# Patient Record
Sex: MALE | Race: AMERICAN INDIAN OR ALASKA NATIVE | NOT HISPANIC OR LATINO | URBAN - METROPOLITAN AREA
[De-identification: names, ages, dates, MRNs, and addresses within clinical notes are randomized per-mention and may not be internally consistent; named-entity substitution may affect disease eponyms.]

---
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
## 2021-03-05 ENCOUNTER — APPOINTMENT (OUTPATIENT)
Dept: INTERNAL MEDICINE | Facility: CLINIC | Age: 21
End: 2021-03-05
Payer: MEDICAID

## 2021-03-05 VITALS
HEART RATE: 86 BPM | SYSTOLIC BLOOD PRESSURE: 125 MMHG | WEIGHT: 264 LBS | RESPIRATION RATE: 16 BRPM | DIASTOLIC BLOOD PRESSURE: 86 MMHG | HEIGHT: 70 IN | TEMPERATURE: 97.6 F | BODY MASS INDEX: 37.8 KG/M2 | OXYGEN SATURATION: 96 %

## 2021-03-05 DIAGNOSIS — J45.909 UNSPECIFIED ASTHMA, UNCOMPLICATED: ICD-10-CM

## 2021-03-05 PROCEDURE — 99213 OFFICE O/P EST LOW 20 MIN: CPT

## 2021-03-05 PROCEDURE — 99072 ADDL SUPL MATRL&STAF TM PHE: CPT

## 2021-03-05 NOTE — PHYSICAL EXAM
[No Edema] : there was no peripheral edema [Normal] : affect was normal and insight and judgment were intact [de-identified] : subdermal cyst left cheek

## 2021-03-05 NOTE — HISTORY OF PRESENT ILLNESS
[FreeTextEntry1] : routine follow up [de-identified] : 21yoM, dental school, PMH HLD, obesity presents for follow up\par \par lost 18 pounds\par feels better\par \par would like to see derm for facial cyst\par \par endorses occ chest tightness when exercises

## 2021-05-25 ENCOUNTER — NON-APPOINTMENT (OUTPATIENT)
Age: 21
End: 2021-05-25

## 2021-05-25 ENCOUNTER — APPOINTMENT (OUTPATIENT)
Dept: DERMATOLOGY | Facility: CLINIC | Age: 21
End: 2021-05-25
Payer: MEDICAID

## 2021-05-25 VITALS — BODY MASS INDEX: 35.7 KG/M2 | HEIGHT: 71 IN | WEIGHT: 255 LBS

## 2021-05-25 PROCEDURE — 99203 OFFICE O/P NEW LOW 30 MIN: CPT

## 2021-06-03 ENCOUNTER — APPOINTMENT (OUTPATIENT)
Dept: DERMATOLOGY | Facility: CLINIC | Age: 21
End: 2021-06-03
Payer: MEDICAID

## 2021-06-03 ENCOUNTER — LABORATORY RESULT (OUTPATIENT)
Age: 21
End: 2021-06-03

## 2021-06-03 DIAGNOSIS — D48.5 NEOPLASM OF UNCERTAIN BEHAVIOR OF SKIN: ICD-10-CM

## 2021-06-03 PROCEDURE — 11441 EXC FACE-MM B9+MARG 0.6-1 CM: CPT

## 2021-06-03 PROCEDURE — 12051 INTMD RPR FACE/MM 2.5 CM/<: CPT

## 2021-06-10 ENCOUNTER — NON-APPOINTMENT (OUTPATIENT)
Age: 21
End: 2021-06-10

## 2021-06-29 ENCOUNTER — NON-APPOINTMENT (OUTPATIENT)
Age: 21
End: 2021-06-29

## 2021-07-01 ENCOUNTER — NON-APPOINTMENT (OUTPATIENT)
Age: 21
End: 2021-07-01

## 2021-07-08 ENCOUNTER — EMERGENCY (EMERGENCY)
Facility: HOSPITAL | Age: 21
LOS: 1 days | Discharge: HOME | End: 2021-07-08
Attending: EMERGENCY MEDICINE
Payer: MEDICAID

## 2021-07-08 VITALS
HEART RATE: 81 BPM | TEMPERATURE: 98 F | RESPIRATION RATE: 17 BRPM | SYSTOLIC BLOOD PRESSURE: 128 MMHG | HEIGHT: 71 IN | OXYGEN SATURATION: 96 % | DIASTOLIC BLOOD PRESSURE: 71 MMHG | WEIGHT: 259.93 LBS

## 2021-07-08 PROCEDURE — 99283 EMERGENCY DEPT VISIT LOW MDM: CPT | Mod: 25

## 2021-07-08 PROCEDURE — 99283 EMERGENCY DEPT VISIT LOW MDM: CPT

## 2021-07-08 PROCEDURE — 73630 X-RAY EXAM OF FOOT: CPT | Mod: 26,LT

## 2021-07-08 PROCEDURE — 73630 X-RAY EXAM OF FOOT: CPT

## 2021-07-08 RX ORDER — DICLOFENAC SODIUM 30 MG/G
1 GEL TOPICAL
Qty: 60 | Refills: 0
Start: 2021-07-08 | End: 2021-07-21

## 2021-07-08 NOTE — ED PROVIDER NOTE - OBJECTIVE STATEMENT
21 y.o y.o male with no significant PMHx and PSHx presenting to the ED complaining of lateral pain on the left foot. Patient rates this pain a 4-5/10. Patient was playing soccer and developed this pain. Patient denies any other complaints. NKDA.

## 2021-07-08 NOTE — ED PROVIDER NOTE - PATIENT PORTAL LINK FT
You can access the FollowMyHealth Patient Portal offered by [facility] by registering at the following website: [email or web address]. By joining [email or web address]’s FollowMyHealth portal, you will also be able to view your health information using other applications (apps) compatible with our system.

## 2021-07-08 NOTE — ED PROVIDER NOTE - CLINICAL SUMMARY MEDICAL DECISION MAKING FREE TEXT BOX
21 y.o male presents with foot pain. Patient was consulted on no sports for x2 weeks and home economics. Will do x-ray and pain control.

## 2021-07-08 NOTE — ED ADULT NURSE NOTE - NSIMPLEMENTINTERV_GEN_ALL_ED
Implemented All Universal Safety Interventions:  [place] to call system. Call bell, personal items and telephone within reach. Instruct patient to call for assistance. Room bathroom lighting operational. Non-slip footwear when patient is off stretcher. Physically safe environment: no spills, clutter or unnecessary equipment. Stretcher in lowest position, wheels locked, appropriate side rails in place.

## 2021-07-13 ENCOUNTER — APPOINTMENT (OUTPATIENT)
Dept: DERMATOLOGY | Facility: CLINIC | Age: 21
End: 2021-07-13

## 2021-09-14 LAB
ALBUMIN SERPL ELPH-MCNC: 5 G/DL
ALP BLD-CCNC: 88 U/L
ALT SERPL-CCNC: 48 U/L
ANION GAP SERPL CALC-SCNC: 13 MMOL/L
AST SERPL-CCNC: 25 U/L
BASOPHILS # BLD AUTO: 0.07 K/UL
BASOPHILS NFR BLD AUTO: 0.7 %
BILIRUB SERPL-MCNC: 0.5 MG/DL
BUN SERPL-MCNC: 16 MG/DL
CALCIUM SERPL-MCNC: 10.2 MG/DL
CHLORIDE SERPL-SCNC: 102 MMOL/L
CHOLEST SERPL-MCNC: 238 MG/DL
CO2 SERPL-SCNC: 24 MMOL/L
CREAT SERPL-MCNC: 0.91 MG/DL
EOSINOPHIL # BLD AUTO: 0.2 K/UL
EOSINOPHIL NFR BLD AUTO: 2 %
ESTIMATED AVERAGE GLUCOSE: 105 MG/DL
GLUCOSE SERPL-MCNC: 97 MG/DL
HBA1C MFR BLD HPLC: 5.3 %
HCT VFR BLD CALC: 54.1 %
HDLC SERPL-MCNC: 49 MG/DL
HGB BLD-MCNC: 17.8 G/DL
IMM GRANULOCYTES NFR BLD AUTO: 0.4 %
LDLC SERPL CALC-MCNC: 157 MG/DL
LYMPHOCYTES # BLD AUTO: 3.22 K/UL
LYMPHOCYTES NFR BLD AUTO: 32 %
MAN DIFF?: NORMAL
MCHC RBC-ENTMCNC: 29.5 PG
MCHC RBC-ENTMCNC: 32.9 GM/DL
MCV RBC AUTO: 89.7 FL
MONOCYTES # BLD AUTO: 0.82 K/UL
MONOCYTES NFR BLD AUTO: 8.2 %
NEUTROPHILS # BLD AUTO: 5.7 K/UL
NEUTROPHILS NFR BLD AUTO: 56.7 %
NONHDLC SERPL-MCNC: 189 MG/DL
PLATELET # BLD AUTO: 308 K/UL
POTASSIUM SERPL-SCNC: 4.8 MMOL/L
PROT SERPL-MCNC: 7.6 G/DL
RBC # BLD: 6.03 M/UL
RBC # FLD: 13.3 %
SODIUM SERPL-SCNC: 139 MMOL/L
TRIGL SERPL-MCNC: 159 MG/DL
WBC # FLD AUTO: 10.05 K/UL

## 2021-09-17 ENCOUNTER — APPOINTMENT (OUTPATIENT)
Dept: INTERNAL MEDICINE | Facility: CLINIC | Age: 21
End: 2021-09-17
Payer: MEDICAID

## 2021-09-17 ENCOUNTER — TRANSCRIPTION ENCOUNTER (OUTPATIENT)
Age: 21
End: 2021-09-17

## 2021-09-17 VITALS
DIASTOLIC BLOOD PRESSURE: 83 MMHG | SYSTOLIC BLOOD PRESSURE: 132 MMHG | HEIGHT: 71 IN | TEMPERATURE: 97.3 F | BODY MASS INDEX: 37.94 KG/M2 | OXYGEN SATURATION: 97 % | RESPIRATION RATE: 16 BRPM | HEART RATE: 74 BPM | WEIGHT: 271 LBS

## 2021-09-17 DIAGNOSIS — E78.5 HYPERLIPIDEMIA, UNSPECIFIED: ICD-10-CM

## 2021-09-17 PROCEDURE — 99395 PREV VISIT EST AGE 18-39: CPT

## 2021-09-17 RX ORDER — ALBUTEROL SULFATE 90 UG/1
108 (90 BASE) INHALANT RESPIRATORY (INHALATION)
Qty: 1 | Refills: 3 | Status: ACTIVE | COMMUNITY
Start: 2021-09-17 | End: 1900-01-01

## 2021-09-18 PROBLEM — E78.5 HLD (HYPERLIPIDEMIA): Status: ACTIVE | Noted: 2020-07-22
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
## 2022-07-08 ENCOUNTER — APPOINTMENT (OUTPATIENT)
Dept: INTERNAL MEDICINE | Facility: CLINIC | Age: 22
End: 2022-07-08

## 2022-07-08 NOTE — ED ADULT TRIAGE NOTE - AS HEIGHT TYPE
Patient is calling back in regards to her trulicity medication. She states that the pharmacy told her that her insurance does not cover it so she wants to know if the doctor should call her insurance and maybe come up with something so she can have her trulicity and maybe send a referral or paper. Please call patient anytime at [phone].    Thank you,  Monica Gomez   stated

## 2022-07-16 ENCOUNTER — APPOINTMENT (OUTPATIENT)
Dept: INTERNAL MEDICINE | Facility: CLINIC | Age: 22
End: 2022-07-16

## 2022-09-23 ENCOUNTER — APPOINTMENT (OUTPATIENT)
Dept: INTERNAL MEDICINE | Facility: CLINIC | Age: 22
End: 2022-09-23

## 2022-09-23 VITALS
WEIGHT: 283 LBS | TEMPERATURE: 97.9 F | RESPIRATION RATE: 16 BRPM | SYSTOLIC BLOOD PRESSURE: 143 MMHG | DIASTOLIC BLOOD PRESSURE: 83 MMHG | HEIGHT: 71 IN | OXYGEN SATURATION: 98 % | BODY MASS INDEX: 39.62 KG/M2 | HEART RATE: 74 BPM

## 2022-09-23 DIAGNOSIS — Z00.00 ENCOUNTER FOR GENERAL ADULT MEDICAL EXAMINATION W/OUT ABNORMAL FINDINGS: ICD-10-CM

## 2022-09-23 PROCEDURE — 99395 PREV VISIT EST AGE 18-39: CPT

## 2022-09-23 RX ORDER — MUPIROCIN 20 MG/G
2 OINTMENT TOPICAL
Qty: 1 | Refills: 0 | Status: DISCONTINUED | COMMUNITY
Start: 2021-06-03 | End: 2022-09-23

## 2022-09-24 NOTE — HISTORY OF PRESENT ILLNESS
[FreeTextEntry1] : annual exam [de-identified] : 22yoM, PMH HLD, obesity presents for annual exam\par \par finishing college, will take gap year before deciding on graduate school\par \par no complaints\par \par declined flu vaccine

## 2022-09-24 NOTE — HEALTH RISK ASSESSMENT
[Never] : Never [No] : No [0] : 2) Feeling down, depressed, or hopeless: Not at all (0) [[id number]] : 0

## 2022-09-29 LAB
ALBUMIN SERPL ELPH-MCNC: 4.9 G/DL
ALP BLD-CCNC: 89 U/L
ALT SERPL-CCNC: 40 U/L
ANION GAP SERPL CALC-SCNC: 12 MMOL/L
AST SERPL-CCNC: 24 U/L
BILIRUB SERPL-MCNC: 0.4 MG/DL
BUN SERPL-MCNC: 15 MG/DL
CALCIUM SERPL-MCNC: 10.3 MG/DL
CHLORIDE SERPL-SCNC: 105 MMOL/L
CHOLEST SERPL-MCNC: 240 MG/DL
CO2 SERPL-SCNC: 24 MMOL/L
CREAT SERPL-MCNC: 0.98 MG/DL
EGFR: 113 ML/MIN/1.73M2
ESTIMATED AVERAGE GLUCOSE: 111 MG/DL
GLUCOSE SERPL-MCNC: 87 MG/DL
HBA1C MFR BLD HPLC: 5.5 %
HDLC SERPL-MCNC: 42 MG/DL
LDLC SERPL CALC-MCNC: 179 MG/DL
NONHDLC SERPL-MCNC: 198 MG/DL
POTASSIUM SERPL-SCNC: 5.1 MMOL/L
PROT SERPL-MCNC: 7.4 G/DL
SODIUM SERPL-SCNC: 141 MMOL/L
TRIGL SERPL-MCNC: 94 MG/DL
TSH SERPL-ACNC: 2.4 UIU/ML

## 2022-10-13 ENCOUNTER — NON-APPOINTMENT (OUTPATIENT)
Age: 22
End: 2022-10-13

## 2023-07-07 LAB
CHOLEST SERPL-MCNC: 198 MG/DL
ESTIMATED AVERAGE GLUCOSE: 105 MG/DL
HBA1C MFR BLD HPLC: 5.3 %
HDLC SERPL-MCNC: 44 MG/DL
LDLC SERPL CALC-MCNC: 135 MG/DL
NONHDLC SERPL-MCNC: 154 MG/DL
TRIGL SERPL-MCNC: 95 MG/DL

## 2023-07-10 ENCOUNTER — NON-APPOINTMENT (OUTPATIENT)
Age: 23
End: 2023-07-10

## 2023-07-10 ENCOUNTER — APPOINTMENT (OUTPATIENT)
Dept: INTERNAL MEDICINE | Facility: CLINIC | Age: 23
End: 2023-07-10
Payer: MEDICAID

## 2023-07-10 VITALS
OXYGEN SATURATION: 97 % | TEMPERATURE: 98 F | RESPIRATION RATE: 16 BRPM | WEIGHT: 276 LBS | HEART RATE: 76 BPM | BODY MASS INDEX: 38.64 KG/M2 | HEIGHT: 71 IN | DIASTOLIC BLOOD PRESSURE: 87 MMHG | SYSTOLIC BLOOD PRESSURE: 131 MMHG

## 2023-07-10 PROCEDURE — 93000 ELECTROCARDIOGRAM COMPLETE: CPT

## 2023-07-10 PROCEDURE — 99214 OFFICE O/P EST MOD 30 MIN: CPT

## 2023-07-12 NOTE — HISTORY OF PRESENT ILLNESS
[FreeTextEntry1] : review labs [de-identified] : 23yoM, PMH HLD, obesity presents for routine follow up\par \par started weight training - drinks energy drink daily before workout - energy drink has >300mg caffeine\par denies headache, chest pain, sob\par \par lipid profile improved

## 2023-08-25 ENCOUNTER — APPOINTMENT (OUTPATIENT)
Dept: INTERNAL MEDICINE | Facility: CLINIC | Age: 23
End: 2023-08-25
Payer: MEDICAID

## 2023-08-25 DIAGNOSIS — R40.0 SOMNOLENCE: ICD-10-CM

## 2023-08-25 PROCEDURE — 99213 OFFICE O/P EST LOW 20 MIN: CPT

## 2023-08-27 PROBLEM — R40.0 DAYTIME SOMNOLENCE: Status: ACTIVE | Noted: 2023-08-25

## 2023-08-27 NOTE — PHYSICAL EXAM
[No Acute Distress] : no acute distress [Well Nourished] : well nourished [Well Developed] : well developed [Well-Appearing] : well-appearing [Normal Sclera/Conjunctiva] : normal sclera/conjunctiva [PERRL] : pupils equal round and reactive to light [EOMI] : extraocular movements intact [Normal Oropharynx] : the oropharynx was normal [Normal Outer Ear/Nose] : the outer ears and nose were normal in appearance [No JVD] : no jugular venous distention [No Lymphadenopathy] : no lymphadenopathy [Supple] : supple [Thyroid Normal, No Nodules] : the thyroid was normal and there were no nodules present [No Respiratory Distress] : no respiratory distress  [Clear to Auscultation] : lungs were clear to auscultation bilaterally [No Accessory Muscle Use] : no accessory muscle use [Normal Rate] : normal rate  [Regular Rhythm] : with a regular rhythm [Normal S1, S2] : normal S1 and S2 [No Murmur] : no murmur heard [No Abdominal Bruit] : a ~M bruit was not heard ~T in the abdomen [No Carotid Bruits] : no carotid bruits [No Varicosities] : no varicosities [Pedal Pulses Present] : the pedal pulses are present [No Edema] : there was no peripheral edema [No Palpable Aorta] : no palpable aorta [No Extremity Clubbing/Cyanosis] : no extremity clubbing/cyanosis [Soft] : abdomen soft [Non Tender] : non-tender [Non-distended] : non-distended [No Masses] : no abdominal mass palpated [No HSM] : no HSM [Normal Bowel Sounds] : normal bowel sounds [Normal Posterior Cervical Nodes] : no posterior cervical lymphadenopathy [Normal Anterior Cervical Nodes] : no anterior cervical lymphadenopathy [No CVA Tenderness] : no CVA  tenderness [No Spinal Tenderness] : no spinal tenderness [Grossly Normal Strength/Tone] : grossly normal strength/tone [No Joint Swelling] : no joint swelling [No Rash] : no rash [Coordination Grossly Intact] : coordination grossly intact [No Focal Deficits] : no focal deficits [Normal Gait] : normal gait [Deep Tendon Reflexes (DTR)] : deep tendon reflexes were 2+ and symmetric [Normal Affect] : the affect was normal [Normal Insight/Judgement] : insight and judgment were intact

## 2023-09-19 RX ORDER — FLUTICASONE PROPIONATE 50 UG/1
50 SPRAY, METERED NASAL TWICE DAILY
Qty: 1 | Refills: 0 | Status: ACTIVE | COMMUNITY
Start: 2021-03-05

## 2023-09-19 RX ORDER — ALBUTEROL SULFATE 90 UG/1
108 (90 BASE) INHALANT RESPIRATORY (INHALATION)
Qty: 1 | Refills: 1 | Status: ACTIVE | COMMUNITY
Start: 2021-03-05

## 2023-10-30 ENCOUNTER — APPOINTMENT (OUTPATIENT)
Dept: ORTHOPEDIC SURGERY | Facility: CLINIC | Age: 23
End: 2023-10-30

## 2024-06-01 ENCOUNTER — APPOINTMENT (OUTPATIENT)
Dept: INTERNAL MEDICINE | Facility: CLINIC | Age: 24
End: 2024-06-01
Payer: MEDICAID

## 2024-06-01 VITALS
DIASTOLIC BLOOD PRESSURE: 80 MMHG | RESPIRATION RATE: 16 BRPM | BODY MASS INDEX: 41.02 KG/M2 | HEIGHT: 71 IN | WEIGHT: 293 LBS | SYSTOLIC BLOOD PRESSURE: 139 MMHG | HEART RATE: 86 BPM | TEMPERATURE: 97.2 F | OXYGEN SATURATION: 97 %

## 2024-06-01 DIAGNOSIS — E66.9 OBESITY, UNSPECIFIED: ICD-10-CM

## 2024-06-01 DIAGNOSIS — R03.0 ELEVATED BLOOD-PRESSURE READING, W/OUT DIAGNOSIS OF HYPERTENSION: ICD-10-CM

## 2024-06-01 PROCEDURE — 99395 PREV VISIT EST AGE 18-39: CPT | Mod: 25

## 2024-06-01 RX ORDER — FLUTICASONE FUROATE 27.5 UG/1
27.5 SPRAY, METERED NASAL DAILY
Qty: 1 | Refills: 0 | Status: DISCONTINUED | COMMUNITY
Start: 2021-09-17 | End: 2024-06-01

## 2024-06-05 PROBLEM — R03.0 ELEVATED BLOOD PRESSURE READING: Status: ACTIVE | Noted: 2023-07-10

## 2024-06-05 PROBLEM — E66.9 OBESITY: Status: ACTIVE | Noted: 2020-07-21

## 2024-06-05 NOTE — HISTORY OF PRESENT ILLNESS
[de-identified] : 24yoM dental school student PMH morbid obesity, elevated blood pressure, environmental allergies presents for annual exam  gained almost 20lb since last visit endorses poor eating habits - take out/fast food/no routine exercise program  declined starting antihypertensives denies cp, palpitations, sob, orthopnea, ha, vision change  did not schedule pulm evaluation as advised   UTD age-appropriate vaccines

## 2024-06-07 LAB
ALBUMIN SERPL ELPH-MCNC: 4.7 G/DL
ALP BLD-CCNC: 90 U/L
ALT SERPL-CCNC: 41 U/L
ANION GAP SERPL CALC-SCNC: 14 MMOL/L
APPEARANCE: CLEAR
AST SERPL-CCNC: 21 U/L
BILIRUB SERPL-MCNC: 0.4 MG/DL
BILIRUBIN URINE: NEGATIVE
BLOOD URINE: NEGATIVE
BUN SERPL-MCNC: 15 MG/DL
CALCIUM SERPL-MCNC: 10 MG/DL
CHLORIDE SERPL-SCNC: 102 MMOL/L
CHOLEST SERPL-MCNC: 228 MG/DL
CO2 SERPL-SCNC: 22 MMOL/L
COLOR: YELLOW
CREAT SERPL-MCNC: 0.93 MG/DL
EGFR: 118 ML/MIN/1.73M2
ESTIMATED AVERAGE GLUCOSE: 108 MG/DL
GLUCOSE QUALITATIVE U: NEGATIVE MG/DL
GLUCOSE SERPL-MCNC: 97 MG/DL
HBA1C MFR BLD HPLC: 5.4 %
HCT VFR BLD CALC: 50.9 %
HCV AB SER QL: NONREACTIVE
HCV S/CO RATIO: 0.51 S/CO
HDLC SERPL-MCNC: 44 MG/DL
HGB BLD-MCNC: 16.7 G/DL
KETONES URINE: NEGATIVE MG/DL
LDLC SERPL CALC-MCNC: 162 MG/DL
LEUKOCYTE ESTERASE URINE: NEGATIVE
MCHC RBC-ENTMCNC: 28 PG
MCHC RBC-ENTMCNC: 32.8 GM/DL
MCV RBC AUTO: 85.4 FL
NITRITE URINE: NEGATIVE
NONHDLC SERPL-MCNC: 185 MG/DL
PH URINE: 6
PLATELET # BLD AUTO: 313 K/UL
POTASSIUM SERPL-SCNC: 4.5 MMOL/L
PROT SERPL-MCNC: 7.2 G/DL
PROTEIN URINE: NORMAL MG/DL
RBC # BLD: 5.96 M/UL
RBC # FLD: 12.7 %
SODIUM SERPL-SCNC: 138 MMOL/L
SPECIFIC GRAVITY URINE: 1.02
THYROGLOB AB SERPL-ACNC: <20 IU/ML
THYROPEROXIDASE AB SERPL IA-ACNC: <10 IU/ML
TRIGL SERPL-MCNC: 125 MG/DL
TSH SERPL-ACNC: 4.09 UIU/ML
UROBILINOGEN URINE: 0.2 MG/DL
WBC # FLD AUTO: 10.17 K/UL

## 2024-06-21 ENCOUNTER — APPOINTMENT (OUTPATIENT)
Dept: ORTHOPEDIC SURGERY | Facility: CLINIC | Age: 24
End: 2024-06-21

## 2024-06-21 DIAGNOSIS — M54.9 DORSALGIA, UNSPECIFIED: ICD-10-CM

## 2024-07-08 ENCOUNTER — APPOINTMENT (OUTPATIENT)
Dept: PULMONOLOGY | Facility: CLINIC | Age: 24
End: 2024-07-08
Payer: MEDICAID

## 2024-07-08 VITALS
BODY MASS INDEX: 40.6 KG/M2 | DIASTOLIC BLOOD PRESSURE: 102 MMHG | HEART RATE: 97 BPM | SYSTOLIC BLOOD PRESSURE: 120 MMHG | WEIGHT: 290 LBS | OXYGEN SATURATION: 97 % | TEMPERATURE: 97.7 F | HEIGHT: 71 IN

## 2024-07-08 VITALS — SYSTOLIC BLOOD PRESSURE: 130 MMHG | DIASTOLIC BLOOD PRESSURE: 86 MMHG

## 2024-07-08 DIAGNOSIS — R40.0 SOMNOLENCE: ICD-10-CM

## 2024-07-08 DIAGNOSIS — F17.200 NICOTINE DEPENDENCE, UNSPECIFIED, UNCOMPLICATED: ICD-10-CM

## 2024-07-08 DIAGNOSIS — J45.909 UNSPECIFIED ASTHMA, UNCOMPLICATED: ICD-10-CM

## 2024-07-08 DIAGNOSIS — Z91.89 OTHER SPECIFIED PERSONAL RISK FACTORS, NOT ELSEWHERE CLASSIFIED: ICD-10-CM

## 2024-07-08 PROCEDURE — 99203 OFFICE O/P NEW LOW 30 MIN: CPT

## 2024-09-03 ENCOUNTER — NON-APPOINTMENT (OUTPATIENT)
Age: 24
End: 2024-09-03

## 2024-09-04 ENCOUNTER — APPOINTMENT (OUTPATIENT)
Dept: PULMONOLOGY | Facility: CLINIC | Age: 24
End: 2024-09-04
Payer: MEDICAID

## 2024-09-04 VITALS
OXYGEN SATURATION: 98 % | HEART RATE: 77 BPM | DIASTOLIC BLOOD PRESSURE: 84 MMHG | SYSTOLIC BLOOD PRESSURE: 112 MMHG | TEMPERATURE: 98.2 F | WEIGHT: 284 LBS

## 2024-09-04 DIAGNOSIS — Z92.89 PERSONAL HISTORY OF OTHER MEDICAL TREATMENT: ICD-10-CM

## 2024-09-04 DIAGNOSIS — Z91.89 OTHER SPECIFIED PERSONAL RISK FACTORS, NOT ELSEWHERE CLASSIFIED: ICD-10-CM

## 2024-09-04 PROCEDURE — 99214 OFFICE O/P EST MOD 30 MIN: CPT

## 2024-09-04 NOTE — HISTORY OF PRESENT ILLNESS
[Current] : current [< 20 pack-years] : < 20 pack-years [Never] : never [Snoring] : snoring [Hypersomnolence] : hypersomnolence [TextBox_4] : 24 year old patient presents for evaluation of possible obstructive sleep apnea  he has had HTN.  he has  history of asthma and seasonal allergies  he uses albuterol MDI as needed   Some nasal congestion  H uses albuterol MDI and Flonase          Primary doctor is  Dr mcneil     PSH:  none       PMH:  HTN seasonal allergy         SH:   active smoker  socially     ETOH:  occasional     Occupation: retired, worked as   No exposure to chemicals, dust, asbestos, mold        ALLERGY:   NKDA     environmental/seasonal allergy: pollen, grass,  dust mold       Review of Systems:   No rash, skin problems No dry eyes no mouth ulcers no sinusitis, sinus infections,    somenasal obstruction no dysphagia no dry mouth     no pneumonia  no lung cancer   no CAD no MI no chest pain no murmur no CHF  no edema   no peptic ulcer or gastritis no GERD no abdominal pain no liver disease   no Diabetes no thyroid disease no hyperlipidemia     no bleeding   no DVT or PE   no kidney disease   no stroke no seizure                         [TextBox_22] : socially,  [Witnessed Apneas] : no witnessed apneas [ESS] : 8

## 2024-09-04 NOTE — PHYSICAL EXAM
[No Acute Distress] : no acute distress [Well Developed] : well developed [Well Nourished] : well nourished [Normal Oropharynx] : normal oropharynx [II] : Mallampati Class: II [2+] : Right Tonsil: 2+ [Normal Appearance] : normal appearance [No Neck Mass] : no neck mass [Normal Rate/Rhythm] : normal rate/rhythm [Normal S1, S2] : normal s1, s2 [No Resp Distress] : no resp distress [No Murmurs] : no murmurs [Clear to Auscultation Bilaterally] : clear to auscultation bilaterally [Benign] : benign [Not Tender] : not tender [No Masses] : no masses [No Clubbing] : no clubbing [No Edema] : no edema [No Focal Deficits] : no focal deficits [Oriented x3] : oriented x3 [TextBox_2] : elevated BMI

## 2024-09-04 NOTE — REASON FOR VISIT
[Follow-Up] : a follow-up visit [Asthma] : asthma [Sleep Evaluation] : sleep evaluation [TextBox_44] : HTN, daytime somnolence

## 2024-09-04 NOTE — REASON FOR VISIT
[Follow-Up] : a follow-up visit [Sleep Evaluation] : sleep evaluation [Asthma] : asthma [TextBox_44] : HTN, daytime somnolence

## 2024-09-04 NOTE — HISTORY OF PRESENT ILLNESS
[Current] : current [< 20 pack-years] : < 20 pack-years [Never] : never [Hypersomnolence] : hypersomnolence [Snoring] : snoring [TextBox_4] : 24 year old patient presents for evaluation of possible obstructive sleep apnea  he has had HTN.  he has  history of asthma and seasonal allergies  he uses albuterol MDI as needed   Some nasal congestion  H uses albuterol MDI and Flonase          Primary doctor is  Dr mcneil     PSH:  none       PMH:  HTN seasonal allergy         SH:   active smoker  socially     ETOH:  occasional     Occupation: retired, worked as   No exposure to chemicals, dust, asbestos, mold        ALLERGY:   NKDA     environmental/seasonal allergy: pollen, grass,  dust mold       Review of Systems:   No rash, skin problems No dry eyes no mouth ulcers no sinusitis, sinus infections,    somenasal obstruction no dysphagia no dry mouth     no pneumonia  no lung cancer   no CAD no MI no chest pain no murmur no CHF  no edema   no peptic ulcer or gastritis no GERD no abdominal pain no liver disease   no Diabetes no thyroid disease no hyperlipidemia     no bleeding   no DVT or PE   no kidney disease   no stroke no seizure                         [TextBox_22] : socially,  [Witnessed Apneas] : no witnessed apneas [ESS] : 8

## 2024-09-04 NOTE — DISCUSSION/SUMMARY
[FreeTextEntry1] : 24 year old man who has HTN, mild HLD and mild asthma  he presents for evaluation of possible obstructive sleep apnea as factor for his HTN  BP repeated at this time is 130/85  Asthma has jannet controlled using albuterol MDI as needed   he has had no exacerbations   he had  home overnight sleep study  that demonstrated NHAN of 4.3  with O2 desaturation to 87% with duration  69 seconds Suspect mild obstructive sleep apnea   BP is normal today  PLAN  Repeat test in facility as split night or diagnostic  If diagnosed with obstructive sleep apnea then option include mouth guard, CPAP treatment  or positional and weight loss  Discussed with the patient and his father  Total time spent : 30 minutes Including: Preparation prior to visit - Reviewing prior record, results of tests and Consultation Reports as applicable Conducting an appropriate H & P during today's encounter  reviewing all available CT chest exams.  demonstrating images to pt as appropriate Counseling patient  Note completion  med renewal discussing differential diagnosis  Discussed with the patient an his mother who is present Kan Phelps MD

## 2024-10-02 ENCOUNTER — APPOINTMENT (OUTPATIENT)
Dept: OPHTHALMOLOGY | Facility: CLINIC | Age: 24
End: 2024-10-02
Payer: MEDICAID

## 2024-10-02 ENCOUNTER — NON-APPOINTMENT (OUTPATIENT)
Age: 24
End: 2024-10-02

## 2024-10-02 PROCEDURE — 99204 OFFICE O/P NEW MOD 45 MIN: CPT

## 2024-10-02 PROCEDURE — 92083 EXTENDED VISUAL FIELD XM: CPT

## 2024-10-02 PROCEDURE — 92133 CPTRZD OPH DX IMG PST SGM ON: CPT

## 2024-12-04 ENCOUNTER — APPOINTMENT (OUTPATIENT)
Dept: PULMONOLOGY | Facility: CLINIC | Age: 24
End: 2024-12-04

## 2024-12-05 ENCOUNTER — APPOINTMENT (OUTPATIENT)
Dept: PULMONOLOGY | Facility: CLINIC | Age: 24
End: 2024-12-05

## 2024-12-20 ENCOUNTER — APPOINTMENT (OUTPATIENT)
Dept: INTERNAL MEDICINE | Facility: CLINIC | Age: 24
End: 2024-12-20
Payer: MEDICAID

## 2024-12-20 VITALS
OXYGEN SATURATION: 97 % | SYSTOLIC BLOOD PRESSURE: 146 MMHG | DIASTOLIC BLOOD PRESSURE: 98 MMHG | HEART RATE: 87 BPM | BODY MASS INDEX: 10.08 KG/M2 | HEIGHT: 71 IN | TEMPERATURE: 97.9 F | WEIGHT: 72 LBS | RESPIRATION RATE: 16 BRPM

## 2024-12-20 DIAGNOSIS — D75.1 SECONDARY POLYCYTHEMIA: ICD-10-CM

## 2024-12-20 DIAGNOSIS — E78.5 HYPERLIPIDEMIA, UNSPECIFIED: ICD-10-CM

## 2024-12-20 PROCEDURE — G2211 COMPLEX E/M VISIT ADD ON: CPT | Mod: NC

## 2024-12-20 PROCEDURE — 99214 OFFICE O/P EST MOD 30 MIN: CPT

## 2024-12-24 PROBLEM — D75.1 ERYTHROCYTOSIS: Status: ACTIVE | Noted: 2024-12-20

## 2025-01-31 ENCOUNTER — APPOINTMENT (OUTPATIENT)
Dept: INTERNAL MEDICINE | Facility: CLINIC | Age: 25
End: 2025-01-31
Payer: MEDICAID

## 2025-01-31 VITALS
SYSTOLIC BLOOD PRESSURE: 122 MMHG | RESPIRATION RATE: 16 BRPM | HEART RATE: 74 BPM | TEMPERATURE: 97.5 F | WEIGHT: 274 LBS | DIASTOLIC BLOOD PRESSURE: 86 MMHG | OXYGEN SATURATION: 97 %

## 2025-01-31 DIAGNOSIS — D75.1 SECONDARY POLYCYTHEMIA: ICD-10-CM

## 2025-01-31 PROCEDURE — 90472 IMMUNIZATION ADMIN EACH ADD: CPT

## 2025-01-31 PROCEDURE — 99213 OFFICE O/P EST LOW 20 MIN: CPT | Mod: 25

## 2025-01-31 PROCEDURE — 90656 IIV3 VACC NO PRSV 0.5 ML IM: CPT

## 2025-01-31 PROCEDURE — G0008: CPT

## 2025-01-31 PROCEDURE — 90651 9VHPV VACCINE 2/3 DOSE IM: CPT

## 2025-01-31 RX ORDER — ERGOCALCIFEROL 1.25 MG/1
1.25 MG CAPSULE, LIQUID FILLED ORAL
Qty: 12 | Refills: 3 | Status: ACTIVE | COMMUNITY
Start: 2025-01-31 | End: 1900-01-01

## 2025-02-11 ENCOUNTER — APPOINTMENT (OUTPATIENT)
Dept: HEMATOLOGY ONCOLOGY | Facility: CLINIC | Age: 25
End: 2025-02-11

## 2025-03-07 ENCOUNTER — APPOINTMENT (OUTPATIENT)
Dept: INTERNAL MEDICINE | Facility: CLINIC | Age: 25
End: 2025-03-07

## 2025-03-07 VITALS
SYSTOLIC BLOOD PRESSURE: 138 MMHG | TEMPERATURE: 98.1 F | BODY MASS INDEX: 38.22 KG/M2 | OXYGEN SATURATION: 99 % | DIASTOLIC BLOOD PRESSURE: 83 MMHG | HEART RATE: 81 BPM | WEIGHT: 273 LBS | HEIGHT: 71 IN | RESPIRATION RATE: 16 BRPM

## 2025-03-07 PROCEDURE — 90651 9VHPV VACCINE 2/3 DOSE IM: CPT

## 2025-03-07 PROCEDURE — 90471 IMMUNIZATION ADMIN: CPT

## 2025-03-11 ENCOUNTER — APPOINTMENT (OUTPATIENT)
Dept: UROLOGY | Facility: CLINIC | Age: 25
End: 2025-03-11

## 2025-03-13 ENCOUNTER — APPOINTMENT (OUTPATIENT)
Dept: PULMONOLOGY | Facility: CLINIC | Age: 25
End: 2025-03-13

## 2025-04-23 ENCOUNTER — APPOINTMENT (OUTPATIENT)
Dept: PULMONOLOGY | Facility: CLINIC | Age: 25
End: 2025-04-23
Payer: MEDICAID

## 2025-04-23 VITALS
BODY MASS INDEX: 38.22 KG/M2 | SYSTOLIC BLOOD PRESSURE: 133 MMHG | DIASTOLIC BLOOD PRESSURE: 86 MMHG | WEIGHT: 273 LBS | TEMPERATURE: 98 F | OXYGEN SATURATION: 98 % | HEART RATE: 81 BPM | HEIGHT: 71 IN

## 2025-04-23 DIAGNOSIS — R06.02 SHORTNESS OF BREATH: ICD-10-CM

## 2025-04-23 DIAGNOSIS — R40.0 SOMNOLENCE: ICD-10-CM

## 2025-04-23 DIAGNOSIS — F17.200 NICOTINE DEPENDENCE, UNSPECIFIED, UNCOMPLICATED: ICD-10-CM

## 2025-04-23 DIAGNOSIS — E66.9 OBESITY, UNSPECIFIED: ICD-10-CM

## 2025-04-23 DIAGNOSIS — R06.83 SNORING: ICD-10-CM

## 2025-04-23 PROCEDURE — 94726 PLETHYSMOGRAPHY LUNG VOLUMES: CPT

## 2025-04-23 PROCEDURE — 94060 EVALUATION OF WHEEZING: CPT

## 2025-04-23 PROCEDURE — 94618 PULMONARY STRESS TESTING: CPT

## 2025-04-23 PROCEDURE — 94729 DIFFUSING CAPACITY: CPT

## 2025-04-23 PROCEDURE — 99214 OFFICE O/P EST MOD 30 MIN: CPT | Mod: 25

## 2025-04-23 PROCEDURE — ZZZZZ: CPT

## 2025-05-07 ENCOUNTER — APPOINTMENT (OUTPATIENT)
Dept: OPHTHALMOLOGY | Facility: CLINIC | Age: 25
End: 2025-05-07

## 2025-05-27 ENCOUNTER — APPOINTMENT (OUTPATIENT)
Dept: RADIOLOGY | Facility: CLINIC | Age: 25
End: 2025-05-27
Payer: MEDICAID

## 2025-05-27 ENCOUNTER — APPOINTMENT (OUTPATIENT)
Dept: CT IMAGING | Facility: CLINIC | Age: 25
End: 2025-05-27
Payer: MEDICAID

## 2025-05-27 PROCEDURE — 71250 CT THORAX DX C-: CPT

## 2025-06-06 ENCOUNTER — APPOINTMENT (OUTPATIENT)
Dept: INTERNAL MEDICINE | Facility: CLINIC | Age: 25
End: 2025-06-06

## 2025-06-13 ENCOUNTER — LABORATORY RESULT (OUTPATIENT)
Age: 25
End: 2025-06-13

## 2025-06-13 ENCOUNTER — APPOINTMENT (OUTPATIENT)
Dept: INTERNAL MEDICINE | Facility: CLINIC | Age: 25
End: 2025-06-13
Payer: MEDICAID

## 2025-06-13 VITALS
HEART RATE: 91 BPM | DIASTOLIC BLOOD PRESSURE: 79 MMHG | WEIGHT: 287 LBS | TEMPERATURE: 97.6 F | RESPIRATION RATE: 16 BRPM | SYSTOLIC BLOOD PRESSURE: 125 MMHG | OXYGEN SATURATION: 98 %

## 2025-06-13 PROCEDURE — G0010: CPT

## 2025-06-13 PROCEDURE — 90472 IMMUNIZATION ADMIN EACH ADD: CPT

## 2025-06-13 PROCEDURE — 90746 HEPB VACCINE 3 DOSE ADULT IM: CPT

## 2025-06-13 PROCEDURE — 90651 9VHPV VACCINE 2/3 DOSE IM: CPT

## 2025-06-13 PROCEDURE — 99395 PREV VISIT EST AGE 18-39: CPT | Mod: 25

## 2025-06-16 ENCOUNTER — OUTPATIENT (OUTPATIENT)
Dept: OUTPATIENT SERVICES | Facility: HOSPITAL | Age: 25
LOS: 1 days | End: 2025-06-16
Payer: COMMERCIAL

## 2025-06-16 ENCOUNTER — APPOINTMENT (OUTPATIENT)
Dept: SLEEP CENTER | Facility: HOME HEALTH | Age: 25
End: 2025-06-16
Payer: MEDICAID

## 2025-06-16 DIAGNOSIS — G47.33 OBSTRUCTIVE SLEEP APNEA (ADULT) (PEDIATRIC): ICD-10-CM

## 2025-06-16 LAB
25(OH)D3 SERPL-MCNC: 35.6 NG/ML
ALBUMIN SERPL ELPH-MCNC: 4.7 G/DL
ALP BLD-CCNC: 97 U/L
ALT SERPL-CCNC: 38 U/L
ANION GAP SERPL CALC-SCNC: 16 MMOL/L
APO A-I SERPL-MCNC: 142 MG/DL
APO B SERPL-MCNC: 112 MG/DL
APPEARANCE: CLEAR
AST SERPL-CCNC: 25 U/L
BASOPHILS # BLD AUTO: 0.09 K/UL
BASOPHILS NFR BLD AUTO: 1 %
BILIRUB SERPL-MCNC: 0.5 MG/DL
BILIRUBIN URINE: NEGATIVE
BLOOD URINE: NEGATIVE
BUN SERPL-MCNC: 11 MG/DL
CALCIUM SERPL-MCNC: 10.6 MG/DL
CHLORIDE SERPL-SCNC: 103 MMOL/L
CHOLEST SERPL-MCNC: 218 MG/DL
CO2 SERPL-SCNC: 21 MMOL/L
COLOR: NORMAL
CREAT SERPL-MCNC: 1.09 MG/DL
EGFRCR SERPLBLD CKD-EPI 2021: 97 ML/MIN/1.73M2
EOSINOPHIL # BLD AUTO: 0.16 K/UL
EOSINOPHIL NFR BLD AUTO: 1.7 %
GLUCOSE QUALITATIVE U: NEGATIVE MG/DL
GLUCOSE SERPL-MCNC: 92 MG/DL
HCT VFR BLD CALC: 53.2 %
HDLC SERPL-MCNC: 51 MG/DL
HGB BLD-MCNC: 17.5 G/DL
IMM GRANULOCYTES NFR BLD AUTO: 0.4 %
KETONES URINE: ABNORMAL MG/DL
LDLC SERPL-MCNC: 145 MG/DL
LEUKOCYTE ESTERASE URINE: ABNORMAL
LYMPHOCYTES # BLD AUTO: 2.91 K/UL
LYMPHOCYTES NFR BLD AUTO: 31.5 %
MAN DIFF?: NORMAL
MCHC RBC-ENTMCNC: 29 PG
MCHC RBC-ENTMCNC: 32.9 G/DL
MCV RBC AUTO: 88.1 FL
MONOCYTES # BLD AUTO: 0.74 K/UL
MONOCYTES NFR BLD AUTO: 8 %
NEUTROPHILS # BLD AUTO: 5.3 K/UL
NEUTROPHILS NFR BLD AUTO: 57.4 %
NITRITE URINE: NEGATIVE
NONHDLC SERPL-MCNC: 167 MG/DL
PH URINE: 5.5
PLATELET # BLD AUTO: 314 K/UL
POTASSIUM SERPL-SCNC: 5 MMOL/L
PROT SERPL-MCNC: 7.4 G/DL
PROTEIN URINE: NORMAL MG/DL
RBC # BLD: 6.04 M/UL
RBC # FLD: 13.1 %
SODIUM SERPL-SCNC: 140 MMOL/L
SPECIFIC GRAVITY URINE: 1.03
TRIGL SERPL-MCNC: 121 MG/DL
TSH SERPL-ACNC: 2.02 UIU/ML
UROBILINOGEN URINE: 0.2 MG/DL
WBC # FLD AUTO: 9.24 K/UL

## 2025-06-16 PROCEDURE — 95800 SLP STDY UNATTENDED: CPT | Mod: 26

## 2025-06-16 PROCEDURE — 95800 SLP STDY UNATTENDED: CPT

## 2025-06-20 RX ORDER — CETIRIZINE HYDROCHLORIDE 10 MG/1
10 TABLET, COATED ORAL DAILY
Qty: 30 | Refills: 2 | Status: ACTIVE | COMMUNITY
Start: 2025-06-13 | End: 1900-01-01

## 2025-07-09 ENCOUNTER — APPOINTMENT (OUTPATIENT)
Dept: INTERNAL MEDICINE | Facility: CLINIC | Age: 25
End: 2025-07-09

## 2025-07-17 ENCOUNTER — APPOINTMENT (OUTPATIENT)
Facility: CLINIC | Age: 25
End: 2025-07-17
Payer: MEDICAID

## 2025-07-17 PROCEDURE — G2211 COMPLEX E/M VISIT ADD ON: CPT | Mod: NC,93

## 2025-07-17 PROCEDURE — 99214 OFFICE O/P EST MOD 30 MIN: CPT | Mod: 93

## 2025-07-25 ENCOUNTER — APPOINTMENT (OUTPATIENT)
Dept: INTERNAL MEDICINE | Facility: CLINIC | Age: 25
End: 2025-07-25
Payer: MEDICAID

## 2025-07-25 VITALS
SYSTOLIC BLOOD PRESSURE: 119 MMHG | RESPIRATION RATE: 16 BRPM | DIASTOLIC BLOOD PRESSURE: 81 MMHG | OXYGEN SATURATION: 97 % | HEART RATE: 85 BPM | WEIGHT: 296 LBS | TEMPERATURE: 97.4 F

## 2025-07-25 DIAGNOSIS — Z86.69 PERSONAL HISTORY OF OTHER DISEASES OF THE NERVOUS SYSTEM AND SENSE ORGANS: ICD-10-CM

## 2025-07-25 DIAGNOSIS — Z86.39 PERSONAL HISTORY OF OTHER ENDOCRINE, NUTRITIONAL AND METABOLIC DISEASE: ICD-10-CM

## 2025-07-25 DIAGNOSIS — D75.1 SECONDARY POLYCYTHEMIA: ICD-10-CM

## 2025-07-25 PROCEDURE — 90746 HEPB VACCINE 3 DOSE ADULT IM: CPT

## 2025-07-25 PROCEDURE — 99213 OFFICE O/P EST LOW 20 MIN: CPT | Mod: 25

## 2025-07-25 PROCEDURE — G0010: CPT

## 2025-07-30 RX ORDER — NICOTINE 21-14-7MG
21-14-7 KIT TRANSDERMAL
Qty: 1 | Refills: 0 | Status: ACTIVE | COMMUNITY
Start: 2025-07-25 | End: 1900-01-01

## 2025-08-05 DIAGNOSIS — F17.200 NICOTINE DEPENDENCE, UNSPECIFIED, UNCOMPLICATED: ICD-10-CM

## 2025-08-05 RX ORDER — NICOTINE 21 MG/24H
21 PATCH, EXTENDED RELEASE TRANSDERMAL DAILY
Qty: 2 | Refills: 0 | Status: ACTIVE | COMMUNITY
Start: 2025-08-05 | End: 1900-01-01

## 2025-08-05 RX ORDER — PHENYLEPHRINE HCL 10 MG
7 TABLET ORAL
Qty: 1 | Refills: 0 | Status: ACTIVE | COMMUNITY
Start: 2025-08-05 | End: 1900-01-01

## 2025-08-05 RX ORDER — NICOTINE 14 MG/24H
14 PATCH, EXTENDED RELEASE TRANSDERMAL
Qty: 2 | Refills: 0 | Status: ACTIVE | COMMUNITY
Start: 2025-08-05 | End: 1900-01-01

## 2025-09-17 ENCOUNTER — NON-APPOINTMENT (OUTPATIENT)
Age: 25
End: 2025-09-17

## 2025-09-17 ENCOUNTER — APPOINTMENT (OUTPATIENT)
Dept: OPHTHALMOLOGY | Facility: CLINIC | Age: 25
End: 2025-09-17
Payer: MEDICAID

## 2025-09-17 PROCEDURE — 92133 CPTRZD OPH DX IMG PST SGM ON: CPT

## 2025-09-17 PROCEDURE — 99214 OFFICE O/P EST MOD 30 MIN: CPT

## 2025-09-17 PROCEDURE — 92083 EXTENDED VISUAL FIELD XM: CPT
